# Patient Record
Sex: MALE | Race: AMERICAN INDIAN OR ALASKA NATIVE | Employment: UNEMPLOYED | URBAN - METROPOLITAN AREA
[De-identification: names, ages, dates, MRNs, and addresses within clinical notes are randomized per-mention and may not be internally consistent; named-entity substitution may affect disease eponyms.]

---

## 2019-09-19 ENCOUNTER — HOSPITAL ENCOUNTER (EMERGENCY)
Age: 19
Discharge: HOME | End: 2019-09-19
Attending: EMERGENCY MEDICINE
Payer: COMMERCIAL

## 2019-09-19 VITALS
OXYGEN SATURATION: 98 % | SYSTOLIC BLOOD PRESSURE: 124 MMHG | DIASTOLIC BLOOD PRESSURE: 81 MMHG | TEMPERATURE: 98.5 F | RESPIRATION RATE: 16 BRPM | WEIGHT: 205 LBS | HEIGHT: 67 IN | HEART RATE: 85 BPM | BODY MASS INDEX: 32.18 KG/M2

## 2019-09-19 DIAGNOSIS — Z20.2 STD EXPOSURE: Primary | ICD-10-CM

## 2019-09-19 DIAGNOSIS — N53.12 PAINFUL EJACULATION: ICD-10-CM

## 2019-09-19 LAB
BILIRUBIN URINE: NEGATIVE
BLOOD, URINE: NEGATIVE
CLARITY: CLEAR
COLOR: YELLOW
GLUCOSE URINE: NEGATIVE MG/DL
KETONES, URINE: NEGATIVE MG/DL
LEUKOCYTE ESTERASE, URINE: NEGATIVE
MICROSCOPIC EXAMINATION: NORMAL
NITRITE, URINE: NEGATIVE
PH UA: 6 (ref 5–8)
PROTEIN UA: NEGATIVE MG/DL
SPECIFIC GRAVITY UA: 1.02 (ref 1–1.03)
URINE REFLEX TO CULTURE: NORMAL
URINE TYPE: NORMAL
UROBILINOGEN, URINE: 1 E.U./DL

## 2019-09-19 PROCEDURE — 6370000000 HC RX 637 (ALT 250 FOR IP): Performed by: EMERGENCY MEDICINE

## 2019-09-19 PROCEDURE — 99283 EMERGENCY DEPT VISIT LOW MDM: CPT

## 2019-09-19 PROCEDURE — 96372 THER/PROPH/DIAG INJ SC/IM: CPT

## 2019-09-19 PROCEDURE — 87491 CHLMYD TRACH DNA AMP PROBE: CPT

## 2019-09-19 PROCEDURE — 87591 N.GONORRHOEAE DNA AMP PROB: CPT

## 2019-09-19 PROCEDURE — 6360000002 HC RX W HCPCS: Performed by: EMERGENCY MEDICINE

## 2019-09-19 PROCEDURE — 2580000003 HC RX 258

## 2019-09-19 PROCEDURE — 81003 URINALYSIS AUTO W/O SCOPE: CPT

## 2019-09-19 RX ORDER — METRONIDAZOLE 250 MG/1
2000 TABLET ORAL ONCE
Status: COMPLETED | OUTPATIENT
Start: 2019-09-19 | End: 2019-09-19

## 2019-09-19 RX ORDER — AZITHROMYCIN 250 MG/1
1000 TABLET, FILM COATED ORAL ONCE
Status: COMPLETED | OUTPATIENT
Start: 2019-09-19 | End: 2019-09-19

## 2019-09-19 RX ORDER — CEFTRIAXONE SODIUM 250 MG/1
250 INJECTION, POWDER, FOR SOLUTION INTRAMUSCULAR; INTRAVENOUS ONCE
Status: COMPLETED | OUTPATIENT
Start: 2019-09-19 | End: 2019-09-19

## 2019-09-19 RX ORDER — ONDANSETRON 4 MG/1
4 TABLET, ORALLY DISINTEGRATING ORAL ONCE
Status: COMPLETED | OUTPATIENT
Start: 2019-09-19 | End: 2019-09-19

## 2019-09-19 RX ADMIN — CEFTRIAXONE SODIUM 250 MG: 250 INJECTION, POWDER, FOR SOLUTION INTRAMUSCULAR; INTRAVENOUS at 23:01

## 2019-09-19 RX ADMIN — METRONIDAZOLE 2000 MG: 250 TABLET, FILM COATED ORAL at 23:00

## 2019-09-19 RX ADMIN — AZITHROMYCIN MONOHYDRATE 1000 MG: 250 TABLET ORAL at 22:59

## 2019-09-19 RX ADMIN — WATER 10 ML: 1 INJECTION INTRAMUSCULAR; INTRAVENOUS; SUBCUTANEOUS at 23:01

## 2019-09-19 RX ADMIN — ONDANSETRON 4 MG: 4 TABLET, ORALLY DISINTEGRATING ORAL at 22:58

## 2019-09-19 NOTE — LETTER
[address]: [phone]           September 24, 2019    Robbieson Credit   [address]          Dear Enrique Credit,    This letter is regarding your Emergency Department visit at [facility] Emergency Department on 9/19/2019. Your test results indicate that you were positive for chlamydia but you were treated in the Emergency Department. This is a notification and there is no change in treatment. Your partner(s) should seek treatment also, even if they aren't having symptoms.       Sincerely,         Maryann Pastor 98  [address] [facility]  [place] [address] [phone]

## 2019-09-20 LAB
C. TRACHOMATIS DNA ,URINE: POSITIVE
N. GONORRHOEAE DNA, URINE: NEGATIVE

## 2019-09-20 NOTE — ED PROVIDER NOTES
[address] [place] [address] [place]  Chief Complaint   Patient presents with    Exposure to STD     Pt was with a partner that had chlamydia and wants to be checked as well. Found out Tuesday. No symptoms. HISTORY OF PRESENT ILLNESS  Luly Bolden is a 20 y.o. male who presents to the ED complaining of STD exposure. He is here with a female partner who was apparently positive for chlamydia as well as Trichomonas. The patient says he has a history of STD with discharge in the past but is unsure what one specifically. He denies any dysuria, hematuria, testicular pain, abdominal pain, flank or back pain, penile discharge or genital lesions. He does however have some pain with ejaculation which is new over the past week or 2. No other complaints, modifying factors or associated symptoms. Nursing notes reviewed. Past Medical History:   Diagnosis Date    Asthma      Past Surgical History:   Procedure Laterality Date    FINGER SURGERY      right small finger     History reviewed. No pertinent family history.   Social History     Socioeconomic History    Marital status: Single     Spouse name: Not on file    Number of children: Not on file    Years of education: Not on file    Highest education level: Not on file   Occupational History    Not on file   Social Needs    Financial resource strain: Not on file    Food insecurity:     Worry: Not on file     Inability: Not on file    Transportation needs:     Medical: Not on file     Non-medical: Not on file   Tobacco Use    Smoking status: Never Smoker    Smokeless tobacco: Never Used   Substance and Sexual Activity    Alcohol use: No    Drug use: Not on file    Sexual activity: Not on file   Lifestyle    Physical activity:     Days per week: Not on file     Minutes per session: Not on file    Stress: Not on file   Relationships    Social connections:     Talks on phone: Not on file     Gets together: Not on file

## 2019-11-30 ENCOUNTER — HOSPITAL ENCOUNTER (EMERGENCY)
Age: 19
Discharge: HOME | End: 2019-12-01
Payer: COMMERCIAL

## 2019-11-30 VITALS
SYSTOLIC BLOOD PRESSURE: 140 MMHG | TEMPERATURE: 98.2 F | RESPIRATION RATE: 16 BRPM | HEART RATE: 108 BPM | WEIGHT: 205 LBS | BODY MASS INDEX: 32.18 KG/M2 | OXYGEN SATURATION: 96 % | DIASTOLIC BLOOD PRESSURE: 87 MMHG | HEIGHT: 67 IN

## 2019-11-30 DIAGNOSIS — S51.812A FOREARM LACERATION, LEFT, INITIAL ENCOUNTER: Primary | ICD-10-CM

## 2019-11-30 PROCEDURE — 99282 EMERGENCY DEPT VISIT SF MDM: CPT

## 2019-11-30 PROCEDURE — 4500000021 HC ED LEVEL 1 PROCEDURE

## 2019-12-01 PROCEDURE — 6360000002 HC RX W HCPCS: Performed by: PHYSICIAN ASSISTANT

## 2019-12-01 PROCEDURE — 90471 IMMUNIZATION ADMIN: CPT | Performed by: PHYSICIAN ASSISTANT

## 2019-12-01 PROCEDURE — 90715 TDAP VACCINE 7 YRS/> IM: CPT | Performed by: PHYSICIAN ASSISTANT

## 2019-12-01 RX ORDER — LIDOCAINE HYDROCHLORIDE AND EPINEPHRINE BITARTRATE 20; .01 MG/ML; MG/ML
INJECTION, SOLUTION SUBCUTANEOUS
Status: DISCONTINUED
Start: 2019-12-01 | End: 2019-12-01 | Stop reason: HOSPADM

## 2019-12-01 RX ORDER — LIDOCAINE HYDROCHLORIDE AND EPINEPHRINE 20; 5 MG/ML; UG/ML
20 INJECTION, SOLUTION EPIDURAL; INFILTRATION; INTRACAUDAL; PERINEURAL ONCE
Status: DISCONTINUED | OUTPATIENT
Start: 2019-12-01 | End: 2019-12-01

## 2019-12-01 RX ADMIN — TETANUS TOXOID, REDUCED DIPHTHERIA TOXOID AND ACELLULAR PERTUSSIS VACCINE, ADSORBED 0.5 ML: 5; 2.5; 8; 8; 2.5 SUSPENSION INTRAMUSCULAR at 00:29

## 2019-12-01 ASSESSMENT — ENCOUNTER SYMPTOMS
DIARRHEA: 0
ABDOMINAL PAIN: 0
SHORTNESS OF BREATH: 0
COLOR CHANGE: 0
VOMITING: 0
CHEST TIGHTNESS: 0
NAUSEA: 0

## 2020-01-21 ENCOUNTER — APPOINTMENT (OUTPATIENT)
Dept: GENERAL RADIOLOGY | Age: 20
End: 2020-01-21
Payer: COMMERCIAL

## 2020-01-21 ENCOUNTER — APPOINTMENT (OUTPATIENT)
Dept: CT IMAGING | Age: 20
End: 2020-01-21
Payer: COMMERCIAL

## 2020-01-21 ENCOUNTER — HOSPITAL ENCOUNTER (EMERGENCY)
Age: 20
Discharge: HOME | End: 2020-01-21
Attending: EMERGENCY MEDICINE
Payer: COMMERCIAL

## 2020-01-21 VITALS
SYSTOLIC BLOOD PRESSURE: 113 MMHG | RESPIRATION RATE: 15 BRPM | WEIGHT: 216 LBS | BODY MASS INDEX: 33.9 KG/M2 | HEIGHT: 67 IN | HEART RATE: 97 BPM | TEMPERATURE: 98.5 F | OXYGEN SATURATION: 97 % | DIASTOLIC BLOOD PRESSURE: 58 MMHG

## 2020-01-21 LAB
A/G RATIO: 1.2 (ref 1.1–2.2)
ALBUMIN SERPL-MCNC: 4.1 G/DL (ref 3.4–5)
ALP BLD-CCNC: 87 U/L (ref 40–129)
ALT SERPL-CCNC: 108 U/L (ref 10–40)
AMPHETAMINE SCREEN, URINE: NORMAL
ANION GAP SERPL CALCULATED.3IONS-SCNC: 15 MMOL/L (ref 3–16)
AST SERPL-CCNC: 55 U/L (ref 15–37)
BARBITURATE SCREEN URINE: NORMAL
BASOPHILS ABSOLUTE: 0.1 K/UL (ref 0–0.2)
BASOPHILS RELATIVE PERCENT: 0.8 %
BENZODIAZEPINE SCREEN, URINE: NORMAL
BILIRUB SERPL-MCNC: 0.4 MG/DL (ref 0–1)
BILIRUBIN URINE: NEGATIVE
BLOOD, URINE: NEGATIVE
BUN BLDV-MCNC: 9 MG/DL (ref 7–20)
CALCIUM SERPL-MCNC: 8.8 MG/DL (ref 8.3–10.6)
CANNABINOID SCREEN URINE: NORMAL
CHLORIDE BLD-SCNC: 101 MMOL/L (ref 99–110)
CLARITY: CLEAR
CO2: 22 MMOL/L (ref 21–32)
COCAINE METABOLITE SCREEN URINE: NORMAL
COLOR: YELLOW
CREAT SERPL-MCNC: 0.7 MG/DL (ref 0.9–1.3)
D DIMER: <200 NG/ML DDU (ref 0–229)
EOSINOPHILS ABSOLUTE: 0.1 K/UL (ref 0–0.6)
EOSINOPHILS RELATIVE PERCENT: 0.7 %
ETHANOL: NORMAL MG/DL (ref 0–0.08)
GFR AFRICAN AMERICAN: >60
GFR NON-AFRICAN AMERICAN: >60
GLOBULIN: 3.3 G/DL
GLUCOSE BLD-MCNC: 148 MG/DL (ref 70–99)
GLUCOSE URINE: NEGATIVE MG/DL
HCT VFR BLD CALC: 44.6 % (ref 40.5–52.5)
HEMOGLOBIN: 15.7 G/DL (ref 13.5–17.5)
KETONES, URINE: NEGATIVE MG/DL
LEUKOCYTE ESTERASE, URINE: NEGATIVE
LYMPHOCYTES ABSOLUTE: 0.8 K/UL (ref 1–5.1)
LYMPHOCYTES RELATIVE PERCENT: 11 %
Lab: NORMAL
MCH RBC QN AUTO: 31.7 PG (ref 26–34)
MCHC RBC AUTO-ENTMCNC: 35.2 G/DL (ref 31–36)
MCV RBC AUTO: 90.1 FL (ref 80–100)
METHADONE SCREEN, URINE: NORMAL
MICROSCOPIC EXAMINATION: NORMAL
MONOCYTES ABSOLUTE: 0.9 K/UL (ref 0–1.3)
MONOCYTES RELATIVE PERCENT: 11.7 %
NEUTROPHILS ABSOLUTE: 5.6 K/UL (ref 1.7–7.7)
NEUTROPHILS RELATIVE PERCENT: 75.8 %
NITRITE, URINE: NEGATIVE
OPIATE SCREEN URINE: NORMAL
OXYCODONE URINE: NORMAL
PDW BLD-RTO: 12.7 % (ref 12.4–15.4)
PH UA: 6
PH UA: 6 (ref 5–8)
PHENCYCLIDINE SCREEN URINE: NORMAL
PLATELET # BLD: 175 K/UL (ref 135–450)
PMV BLD AUTO: 8.6 FL (ref 5–10.5)
POTASSIUM SERPL-SCNC: 3.7 MMOL/L (ref 3.5–5.1)
PRO-BNP: 24 PG/ML (ref 0–124)
PROPOXYPHENE SCREEN: NORMAL
PROTEIN UA: NEGATIVE MG/DL
RAPID INFLUENZA  B AGN: POSITIVE
RAPID INFLUENZA A AGN: NEGATIVE
RBC # BLD: 4.95 M/UL (ref 4.2–5.9)
SODIUM BLD-SCNC: 138 MMOL/L (ref 136–145)
SPECIFIC GRAVITY UA: 1.02 (ref 1–1.03)
TOTAL PROTEIN: 7.4 G/DL (ref 6.4–8.2)
TROPONIN: <0.01 NG/ML
URINE REFLEX TO CULTURE: NORMAL
URINE TYPE: NORMAL
UROBILINOGEN, URINE: 0.2 E.U./DL
WBC # BLD: 7.4 K/UL (ref 4–11)

## 2020-01-21 PROCEDURE — 99285 EMERGENCY DEPT VISIT HI MDM: CPT

## 2020-01-21 PROCEDURE — 93005 ELECTROCARDIOGRAM TRACING: CPT | Performed by: EMERGENCY MEDICINE

## 2020-01-21 PROCEDURE — 71260 CT THORAX DX C+: CPT

## 2020-01-21 PROCEDURE — 80307 DRUG TEST PRSMV CHEM ANLYZR: CPT

## 2020-01-21 PROCEDURE — 80053 COMPREHEN METABOLIC PANEL: CPT

## 2020-01-21 PROCEDURE — 83880 ASSAY OF NATRIURETIC PEPTIDE: CPT

## 2020-01-21 PROCEDURE — 84484 ASSAY OF TROPONIN QUANT: CPT

## 2020-01-21 PROCEDURE — 85379 FIBRIN DEGRADATION QUANT: CPT

## 2020-01-21 PROCEDURE — 87804 INFLUENZA ASSAY W/OPTIC: CPT

## 2020-01-21 PROCEDURE — 6370000000 HC RX 637 (ALT 250 FOR IP): Performed by: PHYSICIAN ASSISTANT

## 2020-01-21 PROCEDURE — 6360000004 HC RX CONTRAST MEDICATION: Performed by: PHYSICIAN ASSISTANT

## 2020-01-21 PROCEDURE — 71046 X-RAY EXAM CHEST 2 VIEWS: CPT

## 2020-01-21 PROCEDURE — 6360000002 HC RX W HCPCS: Performed by: PHYSICIAN ASSISTANT

## 2020-01-21 PROCEDURE — 85025 COMPLETE CBC W/AUTO DIFF WBC: CPT

## 2020-01-21 PROCEDURE — 81003 URINALYSIS AUTO W/O SCOPE: CPT

## 2020-01-21 PROCEDURE — 2580000003 HC RX 258: Performed by: PHYSICIAN ASSISTANT

## 2020-01-21 PROCEDURE — G0480 DRUG TEST DEF 1-7 CLASSES: HCPCS

## 2020-01-21 PROCEDURE — 96372 THER/PROPH/DIAG INJ SC/IM: CPT

## 2020-01-21 RX ORDER — 0.9 % SODIUM CHLORIDE 0.9 %
1000 INTRAVENOUS SOLUTION INTRAVENOUS ONCE
Status: COMPLETED | OUTPATIENT
Start: 2020-01-21 | End: 2020-01-21

## 2020-01-21 RX ORDER — NAPROXEN 250 MG/1
500 TABLET ORAL ONCE
Status: COMPLETED | OUTPATIENT
Start: 2020-01-21 | End: 2020-01-21

## 2020-01-21 RX ORDER — OSELTAMIVIR PHOSPHATE 75 MG/1
75 CAPSULE ORAL 2 TIMES DAILY
Qty: 10 CAPSULE | Refills: 0 | Status: SHIPPED | OUTPATIENT
Start: 2020-01-21 | End: 2020-01-26

## 2020-01-21 RX ORDER — HYDROXYZINE HYDROCHLORIDE 50 MG/ML
50 INJECTION, SOLUTION INTRAMUSCULAR ONCE
Status: COMPLETED | OUTPATIENT
Start: 2020-01-21 | End: 2020-01-21

## 2020-01-21 RX ADMIN — IOPAMIDOL 75 ML: 755 INJECTION, SOLUTION INTRAVENOUS at 13:17

## 2020-01-21 RX ADMIN — SODIUM CHLORIDE 1000 ML: 9 INJECTION, SOLUTION INTRAVENOUS at 13:14

## 2020-01-21 RX ADMIN — NAPROXEN 500 MG: 250 TABLET ORAL at 13:56

## 2020-01-21 RX ADMIN — HYDROXYZINE HYDROCHLORIDE 50 MG: 50 INJECTION, SOLUTION INTRAMUSCULAR at 11:01

## 2020-01-21 RX ADMIN — SODIUM CHLORIDE 1000 ML: 9 INJECTION, SOLUTION INTRAVENOUS at 10:55

## 2020-01-21 ASSESSMENT — ENCOUNTER SYMPTOMS
ABDOMINAL PAIN: 0
SHORTNESS OF BREATH: 1
NAUSEA: 0
VOMITING: 0
CHEST TIGHTNESS: 1
RHINORRHEA: 0
COUGH: 0
DIARRHEA: 0

## 2020-01-21 ASSESSMENT — PAIN SCALES - GENERAL
PAINLEVEL_OUTOF10: 6
PAINLEVEL_OUTOF10: 3
PAINLEVEL_OUTOF10: 6

## 2020-01-21 ASSESSMENT — PAIN DESCRIPTION - LOCATION: LOCATION: CHEST

## 2020-01-21 ASSESSMENT — PAIN DESCRIPTION - PAIN TYPE: TYPE: ACUTE PAIN

## 2020-01-21 NOTE — ED PROVIDER NOTES
[address] [facility]        Pt Name: Liang Izquierdo  MRN: [id number]  [place] 2000  Date of evaluation: 1/21/2020  Provider: Sharifa Mccracken PA-C  PCP: Francheska Robbins MD    This patient was seen and evaluated by the attending physician Abhishek Archer MD      [address] [place] [address]       Chief Complaint   Patient presents with    Chest Pain     Pt. comes in today and states that his heart feels funny. Pt. states he feels like his heart sometimes goes really slow and feels funny. Pt. states that it is causing him to have pain in his chest describes it as tightness. HISTORY OF PRESENT ILLNESS   (Location/Symptom, Timing/Onset, Context/Setting, Quality, Duration, Modifying Factors, Severity)  Note limiting factors. Liang Izquierdo is a 20 y.o. male presents to the emergency department today for evaluation for chest tightness, palpitations. The patient states that for the past 2 to 3 months he has been experiencing palpitations, and chest tightness. He states that \"I can feel my heart when it slows down and then goes back to normal\". The patient tells me that he when he runs he gets tired, and he states that after he rest his symptoms will go away. The patient states that he has had constant symptoms since last night which prompted his visit to the ED. The patient appears to be anxious  he denies any recent stress to me at this time. The patient denies any recent travels, immobilizations or surgeries. No history of DVT or PE. No lower leg pain or swelling. He denies any history of hypertension diabetes or hyperlipidemia. No family history of any cardiac events. He does not smoke. Did start with a little bit of congestion and possibly dry cough last night he denies any recent illnesses including fever,, vomiting or diarrhea.   He has no other complaints at this time    Nursing Notes were all reviewed and agreed with or any disagreements were addressed in the HPI. REVIEW OF SYSTEMS    (2-9 systems for level 4, 10 or more for level 5)     Review of Systems   Constitutional: Negative for activity change, appetite change, chills and fever. HENT: Negative for congestion and rhinorrhea. Respiratory: Positive for chest tightness and shortness of breath. Negative for cough. Cardiovascular: Negative for chest pain and palpitations. Gastrointestinal: Negative for abdominal pain, diarrhea, nausea and vomiting. Genitourinary: Negative for difficulty urinating, dysuria and hematuria. Positives and Pertinent negatives as per HPI. Except as noted above in the ROS, all other systems were reviewed and negative. PAST MEDICAL HISTORY     Past Medical History:   Diagnosis Date    Asthma          SURGICAL HISTORY     Past Surgical History:   Procedure Laterality Date    FINGER SURGERY      right small finger         CURRENTMEDICATIONS       Discharge Medication List as of 1/21/2020  3:02 PM      CONTINUE these medications which have NOT CHANGED    Details   ibuprofen (ADVIL;MOTRIN) 200 MG tablet Take 200 mg by mouth every 6 hours as needed for PainHistorical Med      naproxen (NAPROSYN) 500 MG tablet Take 1 tablet by mouth 2 times daily for 20 doses, Disp-20 tablet, R-0Print      lidocaine (LIDODERM) 5 % Place 1 patch onto the skin daily 12 hours on, 12 hours off., Disp-15 patch, R-0Print      acetaminophen-codeine (TYLENOL #3) 300-30 MG per tablet Take 1 tablet by mouth every 4 hours as needed. ALLERGIES     Acetaminophen; Advil [ibuprofen]; and Codeine    FAMILYHISTORY     History reviewed. No pertinent family history.        SOCIAL HISTORY       Social History     Tobacco Use    Smoking status: Never Smoker    Smokeless tobacco: Never Used   Substance Use Topics    Alcohol use: No    Drug use: Not Currently       SCREENINGS             PHYSICAL EXAM    (up to 7 for level 4, 8 or more for level 5)     ED Triage Vitals Laboratory  [address] [place], [place]   Phone [phone]   COMPREHENSIVE METABOLIC PANEL - Abnormal; Notable for the following components:    Glucose 148 (*)     CREATININE 0.7 (*)      (*)     AST 55 (*)     All other components within normal limits    Narrative:     Performed at:  [facility]  [address], [address] [facility]   Phone [phone]   URINE RT REFLEX TO CULTURE    Narrative:     Performed at:  [facility] [address] [place], [address] [facility]   Phone [phone]   URINE DRUG SCREEN    Narrative:     Performed at:  [facility] [address], [place]   Phone [phone]   TROPONIN    Narrative:     Performed at:  [facility] [address] [place], [place]   Phone [phone] PEPTIDE    Narrative:     Performed at:  [facility] [address], [place]   Phone [phone]   D-DIMER, QUANTITATIVE    Narrative:     Performed at:  [facility] [address], [address] [employer]   Phone [phone]   ETHANOL    Narrative:     Performed at:  [facility]  [address], [place]   Phone [phone]       All other labs were within normal range or not returned as of this dictation. EKG: All EKG's are interpreted by the Emergency Department Physician in the absence of a cardiologist.  Please see their note for interpretation of EKG.       RADIOLOGY:   Non-plain film images such as CT, Ultrasound and MRI are read by the radiologist. Plain radiographic images are visualized and preliminarily interpreted by the  ED Provider with the below findings:        Interpretation per the Radiologist below, if available at the time of this note:    CT CHEST PULMONARY EMBOLISM W the past 2 to 3 months he has been experiencing palpitations, and chest tightness. He states that \"I can feel my heart when it slows down and then goes back to normal\". The patient tells me that he when he runs he gets tired, and he states that after he rest his symptoms will go away. The patient states that he has had constant symptoms since last night which prompted his visit to the ED. The patient appears to be anxious  he denies any recent stress to me at this time. The patient denies any recent travels, immobilizations or surgeries. No history of DVT or PE. No lower leg pain or swelling. He denies any history of hypertension diabetes or hyperlipidemia. No family history of any cardiac events. He does not smoke. Did start with a little bit of congestion and possibly dry cough last night he denies any recent illnesses including fever,, vomiting or diarrhea. He has no other complaints at this time      On physical exam, he does have a mildly flat affect, but does appear to be anxious, he is tachycardic and tachypneic. Lab work in ED is relatively unremarkable. His troponin is negative. D-dimer is negative. X-ray shows no acute process. Patient was given 1 L of fluid, and he is noted to be sleeping comfortably but when he awakes his heart rate will increase. The flu obtained which is positive for influenza B. Patient states that he is allergic to Tylenol as well as ibuprofen but in his chart I see that he can take naproxen. He was given this as I feel that he could have a low-grade fever contributing to his increasing tachycardia and tachypnea. CT of chest shows no evidence of PE. After second liter of fluid, he is no longer tachycardic or tachypneic. He tolerated the naproxen well. He will be given a prescription for Tamiflu. Supportive care discussed at home. He is to obtain follow-up with his primary care physician within 2 to 3 days for reevaluation.   He is to return to the ED for any new or worsening symptoms. The patient voiced understanding is agreeable plan. Stable for discharge. My suspicion is low at this time for ACS, pneumonia, pleural effusion, pneumothorax, myocarditis, pericarditis, cardiac tamponade, life-threatening arrhythmia, TAA, acute failure, respiratory distress, acute dissection or other emergent etiology at this time. FINAL IMPRESSION      1. Influenza B    2.  Chest tightness          DISPOSITION/PLAN   DISPOSITION Decision To Discharge 01/21/2020 03:00:42 PM      PATIENT REFERREDTO:  Misty Jeffries MD  [address] [place], [place] [id number]  [facility]  [phone]    Schedule an appointment as soon as possible for a visit in 2 days      [facility] Emergency Department  [address] [facility]  [phone]    If symptoms worsen, As needed      DISCHARGE MEDICATIONS:  Discharge Medication List as of 1/21/2020  3:02 PM      START taking these medications    Details   oseltamivir (TAMIFLU) 75 MG capsule Take 1 capsule by mouth 2 times daily for 5 days, Disp-10 capsule, R-0Print             DISCONTINUED MEDICATIONS:  Discharge Medication List as of 1/21/2020  3:02 PM                 (Please note that portions of this note were completed with a voice recognition program.  Efforts were made to edit the dictations but occasionally words are mis-transcribed.)    Zen Alexis PA-C (electronically signed)            Zen Alexis PA-C  01/22/20 5172

## 2020-01-21 NOTE — LETTER
[facility] Emergency Department  [address]  Phone: [phone]             January 21, 2020    Patient: Sumeet Freire   YOB: 2000   Date of Visit: 1/21/2020       To Whom It May Concern:    Sumeet Freire was seen and treated in our emergency department on 1/21/2020. He may return to work on /report to probation when he is fever free for 24 hours without taking a fever reducing medication. .      Sincerely,             Signature:__________________________________

## 2020-01-21 NOTE — ED NOTES
Reviewed written discharge instructions with patient, he denied questions and verbalized understanding. Patient's mother at the bedside. Patient transferred to wheelchair without difficulty and transported out of ed via wheelchair by nurse. Patient wearing a procedure mask while being transported out of ED.      Linh Ascencio RN  01/21/20 1533

## 2020-01-21 NOTE — ED PROVIDER NOTES
Laboratory studies including d-dimer negative. Influenza studies pending. Chest x-ray unremarkable. Will administer IV fluids and awaiting results of influenza screening. If negative, will consider CT scan of the chest.    During the patient's ED course, the patient was given:  Medications   0.9 % sodium chloride bolus (0 mLs Intravenous Stopped 1/21/20 1157)   hydrOXYzine (VISTARIL) injection 50 mg (50 mg Intramuscular Given 1/21/20 1101)     This chart was created using [address] [facility]. Efforts were made by me to ensure accuracy, however some errors may be present due to limitations of this technology.             Rosalio Grier MD  01/21/20 3989

## 2020-01-21 NOTE — ED NOTES
Patient resting quietly in bed, sleeping, respirations are easy and unlabored. Family members x 2 at the bedside.      Tian Mendoza RN  01/21/20 2089

## 2020-01-22 LAB
EKG ATRIAL RATE: 106 BPM
EKG DIAGNOSIS: NORMAL
EKG P AXIS: 52 DEGREES
EKG P-R INTERVAL: 148 MS
EKG Q-T INTERVAL: 322 MS
EKG QRS DURATION: 80 MS
EKG QTC CALCULATION (BAZETT): 427 MS
EKG R AXIS: 53 DEGREES
EKG T AXIS: 12 DEGREES
EKG VENTRICULAR RATE: 106 BPM

## 2020-01-22 PROCEDURE — 93010 ELECTROCARDIOGRAM REPORT: CPT | Performed by: INTERNAL MEDICINE

## 2021-12-13 ENCOUNTER — HOSPITAL ENCOUNTER (EMERGENCY)
Age: 21
Discharge: HOME | End: 2021-12-13
Payer: COMMERCIAL

## 2021-12-13 ENCOUNTER — APPOINTMENT (OUTPATIENT)
Dept: GENERAL RADIOLOGY | Age: 21
End: 2021-12-13
Payer: COMMERCIAL

## 2021-12-13 VITALS
HEART RATE: 101 BPM | TEMPERATURE: 101.3 F | SYSTOLIC BLOOD PRESSURE: 122 MMHG | BODY MASS INDEX: 32.62 KG/M2 | RESPIRATION RATE: 17 BRPM | WEIGHT: 207.8 LBS | HEIGHT: 67 IN | OXYGEN SATURATION: 97 % | DIASTOLIC BLOOD PRESSURE: 82 MMHG

## 2021-12-13 DIAGNOSIS — Z20.822 PERSON UNDER INVESTIGATION FOR COVID-19: ICD-10-CM

## 2021-12-13 DIAGNOSIS — J06.9 UPPER RESPIRATORY TRACT INFECTION, UNSPECIFIED TYPE: ICD-10-CM

## 2021-12-13 DIAGNOSIS — J10.1 INFLUENZA A: Primary | ICD-10-CM

## 2021-12-13 LAB
RAPID INFLUENZA  B AGN: NEGATIVE
RAPID INFLUENZA A AGN: POSITIVE

## 2021-12-13 PROCEDURE — 99283 EMERGENCY DEPT VISIT LOW MDM: CPT

## 2021-12-13 PROCEDURE — U0003 INFECTIOUS AGENT DETECTION BY NUCLEIC ACID (DNA OR RNA); SEVERE ACUTE RESPIRATORY SYNDROME CORONAVIRUS 2 (SARS-COV-2) (CORONAVIRUS DISEASE [COVID-19]), AMPLIFIED PROBE TECHNIQUE, MAKING USE OF HIGH THROUGHPUT TECHNOLOGIES AS DESCRIBED BY CMS-2020-01-R: HCPCS

## 2021-12-13 PROCEDURE — 6370000000 HC RX 637 (ALT 250 FOR IP): Performed by: PHYSICIAN ASSISTANT

## 2021-12-13 PROCEDURE — 71046 X-RAY EXAM CHEST 2 VIEWS: CPT

## 2021-12-13 PROCEDURE — U0005 INFEC AGEN DETEC AMPLI PROBE: HCPCS

## 2021-12-13 PROCEDURE — 87804 INFLUENZA ASSAY W/OPTIC: CPT

## 2021-12-13 RX ORDER — NAPROXEN 250 MG/1
500 TABLET ORAL ONCE
Status: DISCONTINUED | OUTPATIENT
Start: 2021-12-13 | End: 2021-12-13 | Stop reason: HOSPADM

## 2021-12-13 RX ORDER — NAPROXEN 500 MG/1
500 TABLET ORAL 2 TIMES DAILY PRN
Qty: 20 TABLET | Refills: 0 | Status: SHIPPED | OUTPATIENT
Start: 2021-12-13 | End: 2021-12-23

## 2021-12-13 RX ORDER — ACETAMINOPHEN 500 MG
1000 TABLET ORAL ONCE
Status: COMPLETED | OUTPATIENT
Start: 2021-12-13 | End: 2021-12-13

## 2021-12-13 RX ADMIN — ACETAMINOPHEN 1000 MG: 500 TABLET ORAL at 17:57

## 2021-12-13 ASSESSMENT — ENCOUNTER SYMPTOMS
ABDOMINAL PAIN: 0
VOMITING: 0
SHORTNESS OF BREATH: 0
NAUSEA: 0
COUGH: 1
DIARRHEA: 0
CHEST TIGHTNESS: 0

## 2021-12-13 ASSESSMENT — PAIN SCALES - GENERAL: PAINLEVEL_OUTOF10: 0

## 2021-12-13 NOTE — ED PROVIDER NOTES
[address] [facility]        Pt Name: Nicole Cisneros  MRN: [id number]  [place] 2000  Date of evaluation: 12/13/2021  Provider: Jie Pimentel PA-C  PCP: Mckayla Cote MD  Note Started: 5:49 PM EST       RACHANA. I have evaluated this patient. My supervising physician was available for consultation. CHIEF COMPLAINT       Chief Complaint   Patient presents with    Fever     fever x 2-3 days. coughing up thick white sputum. HISTORY OF PRESENT ILLNESS   (Location, Timing/Onset, Context/Setting, Quality, Duration, Modifying Factors, Severity, Associated Signs and Symptoms)  Note limiting factors. Chief Complaint: Fever and cough    Nicole Cisneros is a 24 y.o. male who presents to the emergency department with difficulties as it pertains to upper respiratory symptoms with a fever and cough that has had now going end of the third day. Patient tells me he has not personally had Covid. He tells me he has not been vaccinated against Covid nor is he been vaccinated with influenza. Patient tells me that he is unsure if he had any potential for sick contact. He states his symptoms are worsened over the past 3 days. He goes on to report he is not had any medications to control his fever as he did not have anything at home. Patient tells me that he is allergic to Motrin which causes his lips to swell but he is able to take Naprosyn which has been prescribed to him before. He also tells me he is allergic to Tylenol with codeine which gave him a rash. Patient states he is not experiencing any significant substernal chest pain palpitations lightheadedness or shortness of breath. He states his cough is questionably productive of white sputum. He states he is not having any GI or  complaints and with the above-mentioned he presents to the ED for evaluation and treatment.   Denies unilateral leg pain or swelling denies a history of DVT and or PE and has no additional risk factors. He denies sore throat pain. He denies wounds. He denies urinary symptoms and has no other potential source of infection upon further questioning. Nursing Notes were all reviewed and agreed with or any disagreements were addressed in the HPI. REVIEW OF SYSTEMS    (2-9 systems for level 4, 10 or more for level 5)     Review of Systems   Constitutional: Positive for fatigue and fever. Negative for activity change and chills. Respiratory: Positive for cough. Negative for chest tightness and shortness of breath. Cardiovascular: Negative for chest pain. Gastrointestinal: Negative for abdominal pain, diarrhea, nausea and vomiting. Genitourinary: Negative for dysuria and flank pain. Musculoskeletal: Positive for myalgias. Skin: Negative for rash and wound. All other systems reviewed and are negative. Positives and Pertinent negatives as per HPI. Except as noted above in the ROS, all other systems were reviewed and negative. PAST MEDICAL HISTORY     Past Medical History:   Diagnosis Date    Asthma          SURGICAL HISTORY     Past Surgical History:   Procedure Laterality Date    FINGER SURGERY      right small finger         CURRENTMEDICATIONS       Previous Medications    LIDOCAINE (LIDODERM) 5 %    Place 1 patch onto the skin daily 12 hours on, 12 hours off. ALLERGIES     Advil [ibuprofen], Codeine, and Acetaminophen    FAMILYHISTORY     History reviewed. No pertinent family history.        SOCIAL HISTORY       Social History     Tobacco Use    Smoking status: Never Smoker    Smokeless tobacco: Never Used   Substance Use Topics    Alcohol use: No    Drug use: Not Currently       SCREENINGS             PHYSICAL EXAM    (up to 7 for level 4, 8 or more for level 5)     ED Triage Vitals [12/13/21 1734]   BP Temp Temp Source Pulse Resp SpO2 Height Weight   123/63 103.1 °F (39.5 °C) Oral 128 18 96 % 5' 7\" (1.702 m) 207 lb 12.8 oz (94.3 kg) Physical Exam  Vitals and nursing note reviewed. Constitutional:       General: He is awake. He is not in acute distress. Appearance: Normal appearance. He is well-developed. He is not ill-appearing, toxic-appearing or diaphoretic. HENT:      Head: Normocephalic and atraumatic. No raccoon eyes, Mclean's sign or laceration. Right Ear: Hearing and external ear normal.      Left Ear: Hearing and external ear normal.      Nose: Nose normal.      Mouth/Throat:      Lips: Pink. Mouth: Mucous membranes are moist.   Eyes:      General: No scleral icterus. Right eye: No discharge. Left eye: No discharge. Conjunctiva/sclera: Conjunctivae normal.   Neck:      Vascular: No JVD. Cardiovascular:      Rate and Rhythm: Regular rhythm. Tachycardia present. Heart sounds: No murmur heard. No friction rub. No gallop. Pulmonary:      Effort: Pulmonary effort is normal. No tachypnea, accessory muscle usage or respiratory distress. Breath sounds: Normal breath sounds and air entry. No wheezing, rhonchi or rales. Abdominal:      General: There is no distension. Palpations: Abdomen is soft. Abdomen is not rigid. There is no mass. Tenderness: There is no abdominal tenderness. There is no guarding or rebound. Musculoskeletal:      Cervical back: Normal range of motion. Skin:     General: Skin is warm and dry. Neurological:      Mental Status: He is alert and oriented to person, place, and time. GCS: GCS eye subscore is 4. GCS verbal subscore is 5. GCS motor subscore is 6. Cranial Nerves: No cranial nerve deficit. Sensory: No sensory deficit. Coordination: Coordination normal.   Psychiatric:         Behavior: Behavior normal. Behavior is cooperative.          DIAGNOSTIC RESULTS   LABS:    Labs Reviewed   RAPID INFLUENZA A/B ANTIGENS - Abnormal; Notable for the following components:       Result Value    Rapid Influenza A Ag POSITIVE (*)     All other components within normal limits    Narrative:     Performed at:  [facility] [address] [facility], [address]   Phone [phone]       When ordered only abnormal lab results are displayed. All other labs were within normal range or not returned as of this dictation. EKG: When ordered, EKG's are interpreted by the Emergency Department Physician in the absence of a cardiologist.  Please see their note for interpretation of EKG. RADIOLOGY:   Non-plain film images such as CT, Ultrasound and MRI are read by the radiologist. Plain radiographic images are visualized and preliminarily interpreted by the ED Provider with the below findings:        Interpretation per the Radiologist below, if available at the time of this note:    XR CHEST (2 VW)   Final Result   Stable chest with no acute abnormality seen. PROCEDURES   Unless otherwise noted below, none     Procedures    CRITICAL CARE TIME   N/A    CONSULTS:  None      EMERGENCY DEPARTMENT COURSE and DIFFERENTIAL DIAGNOSIS/MDM:   Vitals:    Vitals:    12/13/21 1734 12/13/21 1915   BP: 123/63 122/82   Pulse: 128 109   Resp: 18 17   Temp: 103.1 °F (39.5 °C) 101.3 °F (38.5 °C)   TempSrc: Oral    SpO2: 96% 97%   Weight: 207 lb 12.8 oz (94.3 kg)    Height: 5' 7\" (1.702 m)        Patient was given the following medications:  Medications   naproxen (NAPROSYN) tablet 500 mg (500 mg Oral Not Given 12/13/21 1753)   acetaminophen (TYLENOL) tablet 1,000 mg (1,000 mg Oral Given 12/13/21 1757)           The patient's detailed history of present illness is documented as above. Upon arrival to the emergency department the patient's vital signs are as documented. The patient is noted to be hemodynamically stable and febrile. Physical examination findings are as above. Noted to have a temperature of 103.   He is able to tolerate Naprosyn without difficulty and has had Tylenol before and I believe he is just probably allergic to the codeine component. He was also given a big golf. Here in the emergency department his temperature was trending down nicely but he was still at 101.3 and just with trace tachycardia on reevaluation. Patient's chest x-ray demonstrates no evidence of acute cardiopulmonary process COVID-19 is pending and likely going to be negative in the setting that the patient does test positive here in the ED for influenza A. I discussed ongoing care management with this patient. I had initially discussed that we would wait until he was no longer afebrile nontachycardic but he states that he is feeling dramatically better and he like to go home. I do not believe that Tamiflu is indicated in this patient. He'll follow with his primary care physician on outpatient basis and be given medications as below for symptomatic relief in the home environment. I estimate there is low risk for impending respiratory failure/ARDS, acute coronary syndrome, chronic obstructive pulmonary disease, congestive heart failure, pericardial tamponade, pneumonia, pneumothorax, pulmonary embolism, pulmonary edema and/or great vessel dissection and thus I consider the discharge disposition reasonable. I have discussed the diagnosis and risks with this patient and we agree with discharging home to follow-up with their primary doctor. We also discussed returning to the Emergency Department immediately if new or worsening symptoms occur. We have discussed the symptoms which are most concerning (e.g., bloody sputum, fever, worsening pain or shortness of breath, vomiting) that necessitate immediate return. FINAL IMPRESSION      1. Influenza A    2. Upper respiratory tract infection, unspecified type    3.  Person under investigation for COVID-19          DISPOSITION/PLAN   DISPOSITION Decision To Discharge 12/13/2021 06:42:32 PM      PATIENT REFERRED TO:  Kristin Barbour MD  [address] [place] [address] Street  [id number]  [place] [address] [phone]    Schedule an appointment as soon as possible for a visit         DISCHARGE MEDICATIONS:  New Prescriptions    NAPROXEN (NAPROSYN) 500 MG TABLET    Take 1 tablet by mouth 2 times daily as needed for Pain       DISCONTINUED MEDICATIONS:  Discontinued Medications    ACETAMINOPHEN-CODEINE (TYLENOL #3) 300-30 MG PER TABLET    Take 1 tablet by mouth every 4 hours as needed.       IBUPROFEN (ADVIL;MOTRIN) 200 MG TABLET    Take 200 mg by mouth every 6 hours as needed for Pain    NAPROXEN (NAPROSYN) 500 MG TABLET    Take 1 tablet by mouth 2 times daily for 20 doses              (Please note that portions of this note were completed with a voice recognition program.  Efforts were made to edit the dictations but occasionally words are mis-transcribed.)    Giuliana Roth PA-C (electronically signed)           French Patton PA-C  12/13/21 1927

## 2021-12-14 LAB — SARS-COV-2: NOT DETECTED

## 2023-07-07 ENCOUNTER — HOSPITAL ENCOUNTER (EMERGENCY)
Age: 23
Discharge: HOME | End: 2023-07-08
Payer: COMMERCIAL

## 2023-07-07 VITALS
BODY MASS INDEX: 31.95 KG/M2 | TEMPERATURE: 98.5 F | OXYGEN SATURATION: 96 % | DIASTOLIC BLOOD PRESSURE: 87 MMHG | WEIGHT: 204 LBS | RESPIRATION RATE: 16 BRPM | SYSTOLIC BLOOD PRESSURE: 154 MMHG | HEART RATE: 90 BPM

## 2023-07-07 DIAGNOSIS — Z20.2 POSSIBLE EXPOSURE TO STD: Primary | ICD-10-CM

## 2023-07-07 PROCEDURE — 96372 THER/PROPH/DIAG INJ SC/IM: CPT

## 2023-07-07 PROCEDURE — 86780 TREPONEMA PALLIDUM: CPT

## 2023-07-07 PROCEDURE — 99284 EMERGENCY DEPT VISIT MOD MDM: CPT

## 2023-07-07 PROCEDURE — 87491 CHLMYD TRACH DNA AMP PROBE: CPT

## 2023-07-07 PROCEDURE — 81001 URINALYSIS AUTO W/SCOPE: CPT

## 2023-07-07 PROCEDURE — 87591 N.GONORRHOEAE DNA AMP PROB: CPT

## 2023-07-07 RX ORDER — LIDOCAINE HYDROCHLORIDE 10 MG/ML
1 INJECTION, SOLUTION EPIDURAL; INFILTRATION; INTRACAUDAL; PERINEURAL ONCE
Status: COMPLETED | OUTPATIENT
Start: 2023-07-07 | End: 2023-07-08

## 2023-07-07 RX ORDER — AZITHROMYCIN 250 MG/1
1000 TABLET, FILM COATED ORAL ONCE
Status: COMPLETED | OUTPATIENT
Start: 2023-07-08 | End: 2023-07-08

## 2023-07-07 RX ORDER — CEFTRIAXONE 500 MG/1
500 INJECTION, POWDER, FOR SOLUTION INTRAMUSCULAR; INTRAVENOUS ONCE
Status: COMPLETED | OUTPATIENT
Start: 2023-07-07 | End: 2023-07-08

## 2023-07-07 RX ORDER — AZITHROMYCIN 500 MG/1
500 INJECTION, POWDER, LYOPHILIZED, FOR SOLUTION INTRAVENOUS ONCE
Status: DISCONTINUED | OUTPATIENT
Start: 2023-07-07 | End: 2023-07-07 | Stop reason: SDUPTHER

## 2023-07-07 ASSESSMENT — ENCOUNTER SYMPTOMS
DIARRHEA: 0
NAUSEA: 0
SHORTNESS OF BREATH: 0
CHEST TIGHTNESS: 0
VOMITING: 0
ABDOMINAL PAIN: 0

## 2023-07-08 LAB
BACTERIA URNS QL MICRO: NORMAL /HPF
BILIRUB UR QL STRIP.AUTO: NEGATIVE
CLARITY UR: CLEAR
COLOR UR: ABNORMAL
EPI CELLS #/AREA URNS AUTO: 1 /HPF (ref 0–5)
GLUCOSE UR STRIP.AUTO-MCNC: NEGATIVE MG/DL
HGB UR QL STRIP.AUTO: NEGATIVE
HYALINE CASTS #/AREA URNS AUTO: 0 /LPF (ref 0–8)
KETONES UR STRIP.AUTO-MCNC: ABNORMAL MG/DL
LEUKOCYTE ESTERASE UR QL STRIP.AUTO: NEGATIVE
NITRITE UR QL STRIP.AUTO: NEGATIVE
PH UR STRIP.AUTO: 6 [PH] (ref 5–8)
PROT UR STRIP.AUTO-MCNC: ABNORMAL MG/DL
RBC CLUMPS #/AREA URNS AUTO: 0 /HPF (ref 0–4)
REAGIN+T PALLIDUM IGG+IGM SERPL-IMP: NORMAL
SP GR UR STRIP.AUTO: >=1.03 (ref 1–1.03)
TRICHOMONAS #/AREA URNS HPF: NORMAL /[HPF]
UA COMPLETE W REFLEX CULTURE PNL UR: ABNORMAL
UA DIPSTICK W REFLEX MICRO PNL UR: YES
URN SPEC COLLECT METH UR: ABNORMAL
UROBILINOGEN UR STRIP-ACNC: 1 E.U./DL
WBC #/AREA URNS AUTO: 3 /HPF (ref 0–5)

## 2023-07-08 PROCEDURE — 6360000002 HC RX W HCPCS: Performed by: NURSE PRACTITIONER

## 2023-07-08 PROCEDURE — 6370000000 HC RX 637 (ALT 250 FOR IP): Performed by: NURSE PRACTITIONER

## 2023-07-08 PROCEDURE — 2500000003 HC RX 250 WO HCPCS: Performed by: NURSE PRACTITIONER

## 2023-07-08 RX ADMIN — LIDOCAINE HYDROCHLORIDE 1 ML: 10 INJECTION, SOLUTION EPIDURAL; INFILTRATION; INTRACAUDAL; PERINEURAL at 00:09

## 2023-07-08 RX ADMIN — AZITHROMYCIN MONOHYDRATE 1000 MG: 250 TABLET ORAL at 00:08

## 2023-07-08 RX ADMIN — CEFTRIAXONE SODIUM 500 MG: 500 INJECTION, POWDER, FOR SOLUTION INTRAMUSCULAR; INTRAVENOUS at 00:09

## 2023-07-08 NOTE — DISCHARGE INSTRUCTIONS
Please talk with your doctor about HIV testing, and HIV test is important when you are sexually active. We did not do an HIV test in the emergency department tonight. You can get an anonymous HIV test at your local health department, please use google to locate the telephone number.

## 2023-07-11 LAB
C TRACH DNA UR QL NAA+PROBE: POSITIVE
N GONORRHOEA DNA UR QL NAA+PROBE: NEGATIVE

## 2023-10-30 ENCOUNTER — APPOINTMENT (OUTPATIENT)
Dept: GENERAL RADIOLOGY | Age: 23
End: 2023-10-30
Payer: COMMERCIAL

## 2023-10-30 ENCOUNTER — HOSPITAL ENCOUNTER (EMERGENCY)
Age: 23
Discharge: HOME | End: 2023-10-30
Payer: COMMERCIAL

## 2023-10-30 VITALS
HEIGHT: 67 IN | SYSTOLIC BLOOD PRESSURE: 138 MMHG | RESPIRATION RATE: 20 BRPM | WEIGHT: 199 LBS | BODY MASS INDEX: 31.23 KG/M2 | HEART RATE: 97 BPM | TEMPERATURE: 97.9 F | OXYGEN SATURATION: 100 % | DIASTOLIC BLOOD PRESSURE: 85 MMHG

## 2023-10-30 DIAGNOSIS — S60.221A CONTUSION OF RIGHT HAND, INITIAL ENCOUNTER: Primary | ICD-10-CM

## 2023-10-30 PROCEDURE — 99283 EMERGENCY DEPT VISIT LOW MDM: CPT

## 2023-10-30 PROCEDURE — 6370000000 HC RX 637 (ALT 250 FOR IP): Performed by: PHYSICIAN ASSISTANT

## 2023-10-30 PROCEDURE — 73130 X-RAY EXAM OF HAND: CPT

## 2023-10-30 RX ORDER — HYDROCODONE BITARTRATE AND ACETAMINOPHEN 5; 325 MG/1; MG/1
1 TABLET ORAL ONCE
Status: COMPLETED | OUTPATIENT
Start: 2023-10-30 | End: 2023-10-30

## 2023-10-30 RX ADMIN — HYDROCODONE BITARTRATE AND ACETAMINOPHEN 1 TABLET: 5; 325 TABLET ORAL at 10:29

## 2023-10-30 ASSESSMENT — PAIN DESCRIPTION - DESCRIPTORS: DESCRIPTORS: THROBBING;SHARP

## 2023-10-30 ASSESSMENT — PAIN - FUNCTIONAL ASSESSMENT: PAIN_FUNCTIONAL_ASSESSMENT: 0-10

## 2023-10-30 ASSESSMENT — PATIENT HEALTH QUESTIONNAIRE - PHQ9
1. LITTLE INTEREST OR PLEASURE IN DOING THINGS: 0
SUM OF ALL RESPONSES TO PHQ QUESTIONS 1-9: 0
SUM OF ALL RESPONSES TO PHQ9 QUESTIONS 1 & 2: 0
SUM OF ALL RESPONSES TO PHQ QUESTIONS 1-9: 0
2. FEELING DOWN, DEPRESSED OR HOPELESS: 0

## 2023-10-30 ASSESSMENT — PAIN DESCRIPTION - ORIENTATION: ORIENTATION: RIGHT

## 2023-10-30 ASSESSMENT — LIFESTYLE VARIABLES
HOW OFTEN DO YOU HAVE A DRINK CONTAINING ALCOHOL: 2-3 TIMES A WEEK
HOW MANY STANDARD DRINKS CONTAINING ALCOHOL DO YOU HAVE ON A TYPICAL DAY: 1 OR 2

## 2023-10-30 ASSESSMENT — PAIN DESCRIPTION - PAIN TYPE: TYPE: ACUTE PAIN

## 2023-10-30 ASSESSMENT — PAIN DESCRIPTION - LOCATION: LOCATION: HAND

## 2023-10-30 ASSESSMENT — PAIN SCALES - GENERAL: PAINLEVEL_OUTOF10: 7
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
